# Patient Record
Sex: FEMALE | URBAN - NONMETROPOLITAN AREA
[De-identification: names, ages, dates, MRNs, and addresses within clinical notes are randomized per-mention and may not be internally consistent; named-entity substitution may affect disease eponyms.]

---

## 2020-09-23 ENCOUNTER — TRANSFERRED RECORDS (OUTPATIENT)
Dept: HEALTH INFORMATION MANAGEMENT | Facility: OTHER | Age: 30
End: 2020-09-23

## 2020-09-29 ENCOUNTER — TRANSFERRED RECORDS (OUTPATIENT)
Dept: HEALTH INFORMATION MANAGEMENT | Facility: OTHER | Age: 30
End: 2020-09-29

## 2020-10-16 ENCOUNTER — TELEPHONE (OUTPATIENT)
Dept: OBGYN | Facility: OTHER | Age: 30
End: 2020-10-16

## 2020-10-16 NOTE — TELEPHONE ENCOUNTER
Patient called with questions regarding some bleeding she is having.  She had a Leep procedure and an IUD placed on 9/29/20. States she is spotting here and there, no foul odor or unusual discharge, no fever, no pain/cramps. Patient advised it may take her body a few cycles to adjust to the IUD. Patient would like Biopsy results as well.   Will send to MD to review and get back to her next week when MD is in clinic. Patient voiced understanding and had no further questions or concerns at this time.  Audrey Harper RN on 10/16/2020 at 3:36 PM

## 2020-10-16 NOTE — TELEPHONE ENCOUNTER
Message received from [facility] nurse stating patient would like a call today regarding some bleeding she is having.   Left message with patient to call back  ....................Audrey Harper RN  10/16/2020   3:06 PM

## 2020-10-19 NOTE — TELEPHONE ENCOUNTER
Patient called with questions regarding her Leep procedure. Patient received results last week and was told to follow up in 4 months with a colposcopy. Patient had questions regarding this process and those questions were answered with verbalization of understanding. States she will call and get that scheduled. No further questions or concerns at this time.  Audrey Harper RN on 10/19/2020 at 2:56 PM

## 2020-10-19 NOTE — TELEPHONE ENCOUNTER
Angie was supposed to call her with the report. - please contact Angie and see if she was able to reach her  Thank you

## 2020-10-29 ENCOUNTER — NURSE TRIAGE (OUTPATIENT)
Dept: OBGYN | Facility: OTHER | Age: 30
End: 2020-10-29

## 2020-10-29 NOTE — TELEPHONE ENCOUNTER
"Patient reports increased bleeding and cramping since yesterday. She does believe that she can feel her strings. She is unsure if this is a period as she has not had one since having her baby. She is also concerned that the cramping in one-sided. Patient advised to be seen today or tomorrow. She is from [place], so will call her PCP to arrange. She will call back if she needs to be seen here.    January Mendes RN...................10/29/2020 3:28 PM    Additional Information    MILD abdominal pain (e.g., does not interfere with normal activities) that comes and goes (cramps) and present > 48 hours    Negative: Caller has URGENT question and triager unable to answer question    Negative: SEVERE vaginal bleeding (i.e., soaking 2 pads or tampons per hour and present 2 or more hours)    Negative: MODERATE vaginal bleeding (i.e., soaking 1 pad or tampon per hour and present > 6 hours)    Negative: Constant abdominal pain and present > 2 hours    Negative: Patient sounds very sick or weak to the triager    Negative: SEVERE abdominal pain (e.g., excruciating) and present > 1 hour    Negative: Abdominal pain and pregnant < 20 weeks    Negative: Vaginal bleeding and pregnant < 20 weeks    Negative: Vaginal discharge is main symptom    Negative: Shock suspected (e.g., cold/pale/clammy skin, too weak to stand, low BP, rapid pulse)    Negative: Sounds like a life-threatening emergency to the triager    Answer Assessment - Initial Assessment Questions  1. TYPE: What type of IUD do you have?     - Copper IUD (e.g., e.g., Cu-T380A, ParaGard, Nova-T, Flexi-T)    - Hormonal IUD (e.g.e.g., Jaydess, Kyleena, Liletta, LNG-IUS, Mirena, Joselin)      Mirena  2. START DATE:  When was your IUD inserted?      9/29/2020  3. SYMPTOM: What is the main symptom (or question) you're concerned about?\"       Bleeding/cramping  4. ONSET: \"When did the  Bleeding start?\"      Since placement  5. VAGINAL BLEEDING:  Are you having any " "unusual vaginal bleeding?     - NONE    - SPOTTING: spotting, or pinkish / brownish mucous discharge; does not fill panti-liner or pad     - MILD:  less than 1 pad / hour; less than patient's usual menstrual bleeding    - MODERATE: 1-2 pads / hour; small-medium blood clots (e.g., pea, grape, small coin)     - SEVERE: soaking 2 or more pads/hour for 2 or more hours; bleeding not contained by pads or continuous red blood from vagina; large blood clots (e.g., golf ball, large coin)       Slightly less than peak menstrual flow, was previously spotting  6. PAIN: \"Is there any pain?\" If so, ask: \"How bad is it?\" (Scale: 1-10; mild, moderate, severe)      Cramping, right side/back  7. FEVER:\"Is there a fever?\" If yes, ask: \"What is the temperature, how was it measured, and when did it start?\"      no  8. PREGNANCY: \"Are you concerned that you might be pregnant?\" \"When was your last menstrual period?\"      Unsure, no period since birth of baby    Protocols used: CONTRACEPTION - IUD SYMPTOMS AND [id number]    "

## 2020-11-02 ENCOUNTER — TELEPHONE (OUTPATIENT)
Dept: OBGYN | Facility: OTHER | Age: 30
End: 2020-11-02

## 2020-11-02 NOTE — TELEPHONE ENCOUNTER
Returned patients call regarding her IUD. States she had it placed the end of September of this year and had some pain last week.  Now states the pain has subsided and just having some spotting.  Advised patient to give her body a few cycles to regulate with having the IUD and to follow up if she has pain or other concerns.  Patient voiced understanding and agrees with Plan of care at this time.  No further questions or concerns.  Audrey Harper RN on 11/2/2020 at 2:21 PM

## 2020-11-02 NOTE — TELEPHONE ENCOUNTER
Jeniffer called and said that she was talking with Padmini OB RN, last week and has a few more questions.  She would like someone to call her back today.  Sloane Rascon on 11/2/2020 at 9:36 AM

## 2021-02-03 ENCOUNTER — TRANSFERRED RECORDS (OUTPATIENT)
Dept: HEALTH INFORMATION MANAGEMENT | Facility: OTHER | Age: 31
End: 2021-02-03

## 2021-05-05 ENCOUNTER — TRANSFERRED RECORDS (OUTPATIENT)
Dept: HEALTH INFORMATION MANAGEMENT | Facility: OTHER | Age: 31
End: 2021-05-05

## 2021-08-18 ENCOUNTER — TRANSFERRED RECORDS (OUTPATIENT)
Dept: HEALTH INFORMATION MANAGEMENT | Facility: OTHER | Age: 31
End: 2021-08-18